# Patient Record
Sex: MALE | Race: WHITE | Employment: FULL TIME | URBAN - METROPOLITAN AREA
[De-identification: names, ages, dates, MRNs, and addresses within clinical notes are randomized per-mention and may not be internally consistent; named-entity substitution may affect disease eponyms.]

---

## 2020-01-18 ENCOUNTER — HOSPITAL ENCOUNTER (EMERGENCY)
Age: 70
Discharge: HOME | End: 2020-01-18
Attending: EMERGENCY MEDICINE
Payer: COMMERCIAL

## 2020-01-18 ENCOUNTER — APPOINTMENT (OUTPATIENT)
Dept: GENERAL RADIOLOGY | Age: 70
End: 2020-01-18
Payer: COMMERCIAL

## 2020-01-18 VITALS
TEMPERATURE: 98.2 F | SYSTOLIC BLOOD PRESSURE: 147 MMHG | WEIGHT: 220 LBS | DIASTOLIC BLOOD PRESSURE: 78 MMHG | OXYGEN SATURATION: 99 % | RESPIRATION RATE: 16 BRPM | HEIGHT: 72 IN | HEART RATE: 77 BPM | BODY MASS INDEX: 29.8 KG/M2

## 2020-01-18 LAB
A/G RATIO: 2 (ref 1.1–2.2)
ALBUMIN SERPL-MCNC: 4.9 G/DL (ref 3.4–5)
ALP BLD-CCNC: 76 U/L (ref 40–129)
ALT SERPL-CCNC: 14 U/L (ref 10–40)
ANION GAP SERPL CALCULATED.3IONS-SCNC: 13 MMOL/L (ref 3–16)
AST SERPL-CCNC: 15 U/L (ref 15–37)
BASOPHILS ABSOLUTE: 0 K/UL (ref 0–0.2)
BASOPHILS RELATIVE PERCENT: 0.5 %
BILIRUB SERPL-MCNC: 0.3 MG/DL (ref 0–1)
BUN BLDV-MCNC: 20 MG/DL (ref 7–20)
CALCIUM SERPL-MCNC: 9.7 MG/DL (ref 8.3–10.6)
CHLORIDE BLD-SCNC: 96 MMOL/L (ref 99–110)
CO2: 25 MMOL/L (ref 21–32)
CREAT SERPL-MCNC: 1.4 MG/DL (ref 0.8–1.3)
EOSINOPHILS ABSOLUTE: 0.3 K/UL (ref 0–0.6)
EOSINOPHILS RELATIVE PERCENT: 2.6 %
GFR AFRICAN AMERICAN: >60
GFR NON-AFRICAN AMERICAN: 50
GLOBULIN: 2.5 G/DL
GLUCOSE BLD-MCNC: 260 MG/DL (ref 70–99)
HCT VFR BLD CALC: 44.9 % (ref 40.5–52.5)
HEMOGLOBIN: 15.2 G/DL (ref 13.5–17.5)
LYMPHOCYTES ABSOLUTE: 2 K/UL (ref 1–5.1)
LYMPHOCYTES RELATIVE PERCENT: 20.6 %
MCH RBC QN AUTO: 31 PG (ref 26–34)
MCHC RBC AUTO-ENTMCNC: 33.9 G/DL (ref 31–36)
MCV RBC AUTO: 91.5 FL (ref 80–100)
MONOCYTES ABSOLUTE: 0.9 K/UL (ref 0–1.3)
MONOCYTES RELATIVE PERCENT: 9.5 %
NEUTROPHILS ABSOLUTE: 6.6 K/UL (ref 1.7–7.7)
NEUTROPHILS RELATIVE PERCENT: 66.8 %
PDW BLD-RTO: 13.6 % (ref 12.4–15.4)
PLATELET # BLD: 273 K/UL (ref 135–450)
PMV BLD AUTO: 8.1 FL (ref 5–10.5)
POTASSIUM SERPL-SCNC: 4.1 MMOL/L (ref 3.5–5.1)
RBC # BLD: 4.9 M/UL (ref 4.2–5.9)
SODIUM BLD-SCNC: 134 MMOL/L (ref 136–145)
TOTAL PROTEIN: 7.4 G/DL (ref 6.4–8.2)
WBC # BLD: 9.9 K/UL (ref 4–11)

## 2020-01-18 PROCEDURE — 80053 COMPREHEN METABOLIC PANEL: CPT

## 2020-01-18 PROCEDURE — 73030 X-RAY EXAM OF SHOULDER: CPT

## 2020-01-18 PROCEDURE — 85025 COMPLETE CBC W/AUTO DIFF WBC: CPT

## 2020-01-18 PROCEDURE — 99283 EMERGENCY DEPT VISIT LOW MDM: CPT

## 2020-01-18 PROCEDURE — 6370000000 HC RX 637 (ALT 250 FOR IP): Performed by: EMERGENCY MEDICINE

## 2020-01-18 RX ORDER — ACETAMINOPHEN 500 MG
1000 TABLET ORAL ONCE
Status: COMPLETED | OUTPATIENT
Start: 2020-01-18 | End: 2020-01-18

## 2020-01-18 RX ORDER — MORPHINE SULFATE 15 MG/1
15 TABLET ORAL EVERY 4 HOURS PRN
Qty: 11 TABLET | Refills: 0 | Status: SHIPPED | OUTPATIENT
Start: 2020-01-18 | End: 2020-01-21

## 2020-01-18 RX ORDER — CYCLOBENZAPRINE HCL 10 MG
10 TABLET ORAL ONCE
Status: COMPLETED | OUTPATIENT
Start: 2020-01-18 | End: 2020-01-18

## 2020-01-18 RX ORDER — CYCLOBENZAPRINE HCL 10 MG
10 TABLET ORAL 3 TIMES DAILY PRN
Qty: 30 TABLET | Refills: 0 | Status: SHIPPED | OUTPATIENT
Start: 2020-01-18 | End: 2020-01-28

## 2020-01-18 RX ADMIN — ACETAMINOPHEN 1000 MG: 500 TABLET, FILM COATED ORAL at 22:58

## 2020-01-18 RX ADMIN — CYCLOBENZAPRINE 10 MG: 10 TABLET, FILM COATED ORAL at 22:58

## 2020-01-18 ASSESSMENT — PAIN SCALES - GENERAL
PAINLEVEL_OUTOF10: 10
PAINLEVEL_OUTOF10: 10

## 2020-01-18 ASSESSMENT — PAIN DESCRIPTION - LOCATION: LOCATION: SHOULDER

## 2020-01-18 ASSESSMENT — PAIN DESCRIPTION - FREQUENCY: FREQUENCY: CONTINUOUS

## 2020-01-18 ASSESSMENT — PAIN DESCRIPTION - PAIN TYPE: TYPE: ACUTE PAIN

## 2020-01-18 ASSESSMENT — PAIN DESCRIPTION - PROGRESSION: CLINICAL_PROGRESSION: NOT CHANGED

## 2020-01-19 NOTE — ED NOTES
Bed: 18  Expected date:   Expected time:   Means of arrival:   Comments:  closed     Lyssa Alfonso RN  01/18/20 2112

## 2020-01-19 NOTE — ED PROVIDER NOTES
Emergency Physician Note    Chief Complaint  Fall (Pt reports falling down an entire flight of stairs with boxes earlier this evening. Pt reports left neck and shoulder pain. Left shoulder is deformed.)       History of Present Illness  Abelardo Torres is a 81 y.o. male who presents to the ED for shoulder injury. Patient reports that he sustained a mechanical fall down a flight of stairs while moving boxes. He denies any head injury or loss of consciousness. He states he injured his left shoulder. He denies any other acute pain complaints. 10 systems reviewed, pertinent positives per HPI otherwise noted to be negative    I have reviewed the following from the nursing documentation:      Prior to Admission medications    Medication Sig Start Date End Date Taking? Authorizing Provider   aspirin 81 MG chewable tablet Take 81 mg by mouth daily. Historical Provider, MD   Lactobacillus (PROBIATA) TABS Take 1 tablet by mouth daily. Historical Provider, MD   Multiple Vitamins-Minerals (THERAPEUTIC MULTIVITAMIN-MINERALS) tablet Take 1 tablet by mouth daily. Historical Provider, MD   Cholecalciferol (VITAMIN D3) 5000 UNITS TABS Take 1 each by mouth daily. Historical Provider, MD   cyclobenzaprine (FLEXERIL) 10 MG tablet Take 1 tablet by mouth 3 times daily as needed for Muscle spasms. 6/30/14   Hannah Ireland MD   metFORMIN (GLUCOPHAGE) 500 MG tablet Take 1,000 mg by mouth 2 times daily (with meals). Historical Provider, MD   glipiZIDE (GLUCOTROL) 10 MG CR tablet Take 10 mg by mouth 2 times daily. Historical Provider, MD   simvastatin (ZOCOR) 40 MG tablet Take 40 mg by mouth nightly. Historical Provider, MD   lisinopril (PRINIVIL;ZESTRIL) 5 MG tablet Take 5 mg by mouth daily. Historical Provider, MD   levothyroxine (SYNTHROID) 150 MCG tablet Take 150 mcg by mouth daily. Historical Provider, MD   cetirizine (ZYRTEC) 10 MG tablet Take 10 mg by mouth daily.     Historical Provider, MD Allergies as of 2020 - Review Complete 2020   Allergen Reaction Noted    Corn-containing products  2013    Oat  2020    Peanut-containing drug products  2020    Soybean-containing drug products  2013       Past Medical History:   Diagnosis Date    Grave's disease     Type II or unspecified type diabetes mellitus without mention of complication, not stated as uncontrolled         Surgical History: History reviewed. No pertinent surgical history. Family History:  History reviewed. No pertinent family history.     Social History     Socioeconomic History    Marital status:      Spouse name: Not on file    Number of children: Not on file    Years of education: Not on file    Highest education level: Not on file   Occupational History    Not on file   Social Needs    Financial resource strain: Not on file    Food insecurity:     Worry: Not on file     Inability: Not on file    Transportation needs:     Medical: Not on file     Non-medical: Not on file   Tobacco Use    Smoking status: Former Smoker     Packs/day: 1.00     Types: Cigars     Last attempt to quit: 2014     Years since quittin.1   Substance and Sexual Activity    Alcohol use: No    Drug use: Not on file    Sexual activity: Yes     Partners: Female   Lifestyle    Physical activity:     Days per week: Not on file     Minutes per session: Not on file    Stress: Not on file   Relationships    Social connections:     Talks on phone: Not on file     Gets together: Not on file     Attends Buddhist service: Not on file     Active member of club or organization: Not on file     Attends meetings of clubs or organizations: Not on file     Relationship status: Not on file    Intimate partner violence:     Fear of current or ex partner: Not on file     Emotionally abused: Not on file     Physically abused: Not on file     Forced sexual activity: Not on file   Other Topics Concern    Not on file   Social History Narrative    Not on file       Nursing notes reviewed. ED Triage Vitals [01/18/20 2101]   Enc Vitals Group      BP (!) 168/79      Pulse       Resp 18      Temp 97.6 °F (36.4 °C)      Temp Source Oral      SpO2 97 %      Weight 220 lb (99.8 kg)      Height 6' (1.829 m)      Head Circumference       Peak Flow       Pain Score       Pain Loc       Pain Edu? Excl. in [address]? GENERAL:  Awake, alert. Well developed, well nourished with no apparent distress. HENT:  Normocephalic, Atraumatic, moist mucous membranes. EYES:  Pupils equal round and reactive to light, Conjunctiva normal, extraocular movements normal.  NECK:  No meningeal signs, Supple. No tenderness. CHEST:  Regular rate and rhythm, chest wall non-tender. LUNGS:  Clear to auscultation bilaterally. BACK:  No tenderness. No step-offs, contusions or abrasions throughout the cervical, thoracic or lumbar spine. EXTREMITIES: Pain limited range of motion of the left shoulder, deformity palpable at the left [facility] joint, neurovascular intact to the fingers of the left hand,, no deformity, distal pulses present. SKIN: Warm, dry and intact. NEUROLOGIC: Normal mental status. Moving all extremities to command. RADIOLOGY  X-RAYS:  I have reviewed radiologic plain film image(s). ALL OTHER NON-PLAIN FILM IMAGES SUCH AS CT, ULTRASOUND AND MRI HAVE BEEN READ BY THE RADIOLOGIST. XR SHOULDER LEFT (MIN 2 VIEWS)   Final Result   AC joint separation as described above.               LABS  Labs Reviewed   COMPREHENSIVE METABOLIC PANEL - Abnormal; Notable for the following components:       Result Value    Sodium 134 (*)     Chloride 96 (*)     Glucose 260 (*)     CREATININE 1.4 (*)     GFR Non- 50 (*)     All other components within normal limits    Narrative:     Performed at:  [facility] [address],  [place], [facility]   Phone [phone]   CBC WITH AUTO DIFFERENTIAL Narrative:     Performed at:  [facility]) [facility]  [address],  [place], [place]   Phone [phone]       MEDICAL DECISION MAKING         I estimate there is LOW risk for COMPARTMENT SYNDROME, DEEP VENOUS THROMBOSIS, SEPTIC ARTHRITIS, TENDON OR NEUROVASCULAR INJURY, thus I consider the discharge disposition reasonable. Denise Youssef and I have discussed the diagnosis and risks, and we agree with discharging home to follow-up with their primary doctor or the referral orthopedist. We also discussed returning to the Emergency Department immediately if new or worsening symptoms occur. We have discussed the symptoms which are most concerning (e.g., changing or worsening pain, numbness, weakness) that necessitate immediate return. Final Impression    1. AC separation, left, initial encounter        Blood pressure (!) 147/78, pulse 77, temperature 98.2 °F (36.8 °C), temperature source Oral, resp. rate 16, height 6' (1.829 m), weight 220 lb (99.8 kg), SpO2 99 %. Patient was given scripts for the following medications. I counseled patient how to take these medications. Discharge Medication List as of 1/18/2020 10:24 PM      START taking these medications    Details   morphine (MSIR) 15 MG tablet Take 1 tablet by mouth every 4 hours as needed for Pain for up to 3 days. , Disp-11 tablet, R-0Print             Disposition  Pt is in good condition upon Discharge to home. This chart was generated using the [address] [place] [address] [address]  dictation system. I created this record but it may contain dictation errors.           Gisella Cope MD  01/18/20 6226

## 2020-02-21 ENCOUNTER — HOSPITAL ENCOUNTER (OUTPATIENT)
Dept: PHYSICAL THERAPY | Age: 70
Setting detail: THERAPIES SERIES
Discharge: HOME | End: 2020-02-21
Payer: COMMERCIAL

## 2020-02-21 PROCEDURE — 97140 MANUAL THERAPY 1/> REGIONS: CPT

## 2020-02-21 PROCEDURE — 97161 PT EVAL LOW COMPLEX 20 MIN: CPT

## 2020-02-21 PROCEDURE — 20560 NDL INSJ W/O NJX 1 OR 2 MUSC: CPT

## 2020-02-21 NOTE — PLAN OF CARE
[facility]    The following patient has been evaluated for physical therapy services. In order for therapy to continue, Medicare requires physician review of the treatment plan. Please review the attached evaluation and/or summary of the patient's plan of care, and verify that you agree by signing below and sending it back to our office. Thank you for this referral.    Physician signature_______________________ Date________________    Fax to:   [id number] [phone]              [facility] PH[facility]Y EVALUATION      Evaluation Date: 2/21/2020       Patient Name: Sean Smith  YOB: 1950      Medical Diagnosis: Dislocation L AC jt  ICD 10:  S43.122A  Treatment Diagnosis:   Dislocation L AC jt with L shldr pn, decr ROM, decr strength     Onset Date: Jan 18, 2020  Referral Date:  2/19/2020  Referring Physician: Dona Dubon MD     Visits Allowed/Insurance/Certification Information:  Medicare      Restrictions/Precautions:    Contraindications to Manipulations:   none    Red Flag Symptoms: Nausea/vomiting (metformin)  heart palpitations (started with Graves disease, PCP knows)  Adhesive allergy: yes, leaves sores  Latex allergy: no    Pt's Occupation/Job Duties:  Works, , full time, desk work,      Social support/Environment:    Family/caregiver support:      Wife     Home environment:   one story home with basement  Steps to enter first floor:    6 three inch steps to enter   Steps to second floor:  Full flight of 12-13  Bathroom:    Tub/Shower unit and Shower Chair  he does not use  Equipment owned:   Rollator  (as needed)    Health History reviewed with pt:  Graves disease (lost 60lbs), DM II 2004 managed with medication,       SUBJECTIVE FINDINGS        History of Present Illness:  Pt was headed downstairs into the basement and fell head first down the stairs on January 18th, 2020.  Pt went to the ER, went to ortho surgeon, was Testing     All strength tested on 5 point scale  All ROM WFL with exceptions noted below    Range Tested AROM PROM Resisted Comments   *denotes pain Left Right Left Right Left Right    Cervical Flexion (C1-2)          Cervical Sidebend (C3)          Shoulder Shrug (C4)  pnful     pn in posterior clavicular area   Shoulder Flexion  160        Shoulder Extension          Shoulder Abduction (C5)  160        Shoulder Adduction   WNL         Shoulder IR  WNL        Shoulder ER          Elbow Flexion (C6)          Elbow Extension (C7)          Pronation          Supination          Wrist Flexion (C7)          Wrist Extension (C6)          Radial Deviation          Ulnar Deviation                     Thumb Extension (C8)          Hand Intrinsics (T1)              Special Tests- unnecessary     Specific Joint Mobility Testing/Accessory Motions      Temporomandibular:  NT  Cervical:    NT  Thoracic:    NT  Ribs:     NT  AC joint:    hypermobile on L  Glenohumeral joint:   NT      Functional Outcome Measure:     Measure Used:  SPADI  Date Assessed:  2/21/2020  Score:    55/130 (SPADI)  % disability:   42 %      ASSESSMENT   Pt is 77 yo Male presenting to OP PT clinic with medical diagnosis of L ACJ separation. Presents today with L ACJ separation with L shldr pn and dysfunction. Would benefit from continued OP PT to address below impairments, improve pain and restore function. Problems        Decreased UE ROM  Decreased strength  Increased pain  Poor posture/alignment  Decreased functional status    Rehabilitation Potential:  Good for goals listed below. Strengths for achieving goals include:  Pt motivated, PLOF, Family Support and Pt cooperative  Limitations for achieving goals include:  severity of condition    Prognosis:   Good    GOALS    Short Term Goals:    4 weeks Long Term Goals:   8  weeks   1). Establish HEP 1). Pt independent with HEP   2). Pain  0/10 at rest 2). Pain  0/10 with movement   3).  Pt with incr ROM with <2/10 pn  3). Pt with full ROM pn free    4). 4).   5). 5).   6). 6). PLAN OF CARE     To see patient 2x/week for 8 weeks for the following treatment interventions:  Therapeutic Exercise  Manual therapy  Neuromuscular Re-ed  Therapeutic activity    Thank you for the referral of this patient.       Timed Code Treatment Minutes:     31 minutes   Total Treatment Time:    60 minutes    Juan Luis Nur,  PT, DPT, ATC [id number]

## 2020-02-21 NOTE — FLOWSHEET NOTE
complications   []Other:     Goals:   Progress towards goals:  Goals established on 2/21/2020  Short Term Goals:    4 weeks Long Term Goals:   8  weeks   1). Establish HEP 1). Pt independent with HEP   2). Pain  0/10 at rest 2). Pain  0/10 with movement   3). Pt with incr ROM with <2/10 pn  3). Pt with full ROM pn free    4). 4).   5). 5).   6). 6).          Prognosis: [x]Good   []Fair   []Poor  Patient Requires Follow-up:  [x]Yes  []No  Plan: [x]Plan of care initiated     []Continue per plan of care    [] Alter current plan (see comments)    []Hold pending MD visit []Discharge    Timed Code Treatment Minutes:  31  Total Treatment Minutes:  60    Medicare Cap total YTD: 160  Workers Comp Time Stamp  [x]N/A   Time In:   Time Out:     Electronically signed by:  Charlotte Llanes PT, DPT, ATC [id number]

## 2020-02-24 ENCOUNTER — HOSPITAL ENCOUNTER (OUTPATIENT)
Dept: PHYSICAL THERAPY | Age: 70
Setting detail: THERAPIES SERIES
Discharge: HOME | End: 2020-02-24
Payer: COMMERCIAL

## 2020-02-24 PROCEDURE — 20560 NDL INSJ W/O NJX 1 OR 2 MUSC: CPT

## 2020-02-24 PROCEDURE — 97140 MANUAL THERAPY 1/> REGIONS: CPT

## 2020-02-24 NOTE — PRE-CERTIFICATION NOTE
Gilson Bunting is primary - no max  No pre cert  72% co-ins     Medicare A is secondary    Dry needling may not be covered under [employer]    ABN signed for dry needling

## 2020-02-24 NOTE — FLOWSHEET NOTE
Outpatient Physical Therapy     [x] Daily Treatment Note   [] Progress Note   [] Discharge Note    Date:  2/24/2020    Patient Name:  Feliz Frias    YOB: 1950    Medical Diagnosis: Dislocation L AC jt  ICD 10:  S43.122A  Treatment Diagnosis:   Dislocation L AC jt with L shldr pn, decr ROM, decr strength     Onset Date: Jan 18, 2020  Referral Date:  2/19/2020  Referring Physician: Félix Munson MD     Visits Allowed/Insurance/Certification Information: [employer] & Medicare      Restrictions/Precautions:    Contraindications to Manipulations: none  Red Flag Symptoms: Nausea/vomiting (metformin)  heart palpitations (started with Graves disease, PCP knows)  Adhesive allergy: yes, leaves sores  Latex allergy: no     Pt's Occupation/Job Duties:  Works as , full time, desk work,    Plan of care sent to provider:   []NA   [x]Faxed   []Co-signature     Plan of care signed:    []NA   []Yes   []No      Progress Note covers period from (if applicable):    [x]NA    []From          To         Next Progress Note due:   3/20/2020    Visit# / total visits:  2/16  Plan for Next Session:  Address tissue tension, complete PROM, AAROM, mobilization as needed      Subjective:  Pt states he feels he is moving a lot better. Soreness from DN subsided within 2 days and it only hurts with quick movements. Pain level: 0/10 at rest  AT EVAL: Patient describes pain to be aching  Pain location:  Posterior L shldr 2-3/10  Worsened by  Pushing/ pulling  Improved by  unknown  Pt. Reports locking, popping, buckling, etc. L shldr, jaw, feels as though bones rub together in flx  Sleep: poor due to pn     Objective:   pn with LUE abd  BUE ROM WFL bilat  Improved shldr ER     Exercises in bold performed in department today. Items not bolded are carried forward from prior visits for continuity of the record.   Exercise/Equipment Resistance/Repetitions HEP Other comments       []      Standing isometrics, 4 way + IR, ER 7-10 sec x5 [x]    Standing rows   Red x10 [x] Review NV       []        []        []        []        []        []          []         []        []        []        []        []        []        []        []      Therapeutic Exercise/Home Exercise Program:   10 minutes (u)    Manual Therapy:  15 minutes (1u) + DN 8 minutes (1u)  PROM LUE  GrIII CPA tspine grossly  GrIV UPA L lower tspine  PMR L pec      TrP DN L anterior deltoid, L UT with resolution in pn with abd & 4+/5 strength flx, abd without pn    Consent signed 2/21/2020 and precautions addressed. See media tab. **The above techniques were used to restore functional range of motion, reduce muscle spasm, and induce healing in the corresponding musculature by means of intramuscular mobilization. Clean Technique was utilized today while applying the Dry needling treatment. The treatment sites where cleaned with 70% solution of isopropyl alcohol. The PT washed their hands and utilized treatment gloves along with hand  prior to inserting the needles. All needles where removed and discarded in the appropriate sharps container. **  ** Educated patient on anatomy, trigger point etiology, expectations for TDN (bruising, soreness, etc), outcomes, and recommendations for exercise. **    Modalities: 0 minutes    Group Therapy:    0 minutes  Therapeutic Activity:  0 minutes  Gait: 0 minutes  Neuromuscular Re-Education:  0 minutes  Canalith Repositioning Procedure:  0 minutes    Functional Outcome Measure:     Measure Used:           SPADI  Date Assessed:           2/21/2020  Score:                          55/130 (SPADI)  % disability:                 42 %     Assessment/Treatment/Activity Tolerance: Pt with great response to DN last visit, and resolution in pn with treatment this date. Pt unable to tolerate resisted shldr exercise at this time therefore started with isometrics.    Patients response to treatment:   [x]Patient tolerated treatment well []Patient limited by fatigue   []Patient limited by pain  []Patient limited by other medical complications   []Other:     Goals:   Progress towards goals:  Goals established on 2/21/2020  Short Term Goals:    4 weeks Long Term Goals:   8  weeks   1). Establish HEP 1). Pt independent with HEP   2). Pain  0/10 at rest 2). Pain  0/10 with movement   3). Pt with incr ROM with <2/10 pn  3). Pt with full ROM pn free    4). 4).   5). 5).   6). 6).          Prognosis: [x]Good   []Fair   []Poor  Patient Requires Follow-up:  [x]Yes  []No  Plan: []Plan of care initiated     [x]Continue per plan of care    [] Alter current plan (see comments)    []Hold pending MD visit []Discharge    Timed Code Treatment Minutes:  33  Total Treatment Minutes:  33    Medicare Cap total YTD:   Workers Comp Time Stamp  [x]N/A       Electronically signed by:  Gaby Palencia, PT, DPT, ATC [id number]

## 2020-02-26 ENCOUNTER — HOSPITAL ENCOUNTER (OUTPATIENT)
Dept: PHYSICAL THERAPY | Age: 70
Setting detail: THERAPIES SERIES
Discharge: HOME | End: 2020-02-26
Payer: COMMERCIAL

## 2020-02-26 PROCEDURE — 97110 THERAPEUTIC EXERCISES: CPT

## 2020-02-26 PROCEDURE — 97140 MANUAL THERAPY 1/> REGIONS: CPT

## 2020-02-26 NOTE — FLOWSHEET NOTE
Exercises in bold performed in department today. Items not bolded are carried forward from prior visits for continuity of the record. Exercise/Equipment Resistance/Repetitions HEP Other comments       []      Standing isometrics, 4 way + IR, ER 7-10 sec x5 [x]    Standing rows    Red x10 [x] Required corrective cuing   Standing shldr 4 way Red x10 []      SL shldr ER  1# x10 []        []        []        []        []          []         []        []        []        []      Supine breathing x10 min  []      Supine TA/PF x5 min  []        []        []      Therapeutic Exercise/Home Exercise Program:   30 minutes (2u)  Se above. Attempted standing ER with tband, too difficult for pt at this time. Manual Therapy:  minutes (u) + DN  minutes (u)    Consent signed 2/21/2020 and precautions addressed. See media tab. **The above techniques were used to restore functional range of motion, reduce muscle spasm, and induce healing in the corresponding musculature by means of intramuscular mobilization. Clean Technique was utilized today while applying the Dry needling treatment. The treatment sites where cleaned with 70% solution of isopropyl alcohol. The PT washed their hands and utilized treatment gloves along with hand  prior to inserting the needles. All needles where removed and discarded in the appropriate sharps container. **  ** Educated patient on anatomy, trigger point etiology, expectations for TDN (bruising, soreness, etc), outcomes, and recommendations for exercise.  **    Modalities: 0 minutes    Group Therapy:    0 minutes  Therapeutic Activity:  0 minutes  Gait: 0 minutes  Neuromuscular Re-Education:  0 minutes  Canalith Repositioning Procedure:  0 minutes    Functional Outcome Measure:     Measure Used:           SPADI  Date Assessed:           2/21/2020  Score:                          55/130 (SPADI)  % disability:                 42 %     Assessment/Treatment/Activity Tolerance: Pt without pn this date and good tolerance to therapeutic exercises. Pt with non-painful diastasis recti with protrusion in forward flexion which effects pt's ability to lift safely using core vs. Back and UE. Patients response to treatment:   [x]Patient tolerated treatment well []Patient limited by fatigue   []Patient limited by pain  []Patient limited by other medical complications   []Other:     Goals:   Progress towards goals:  Goals established on 2/21/2020  Short Term Goals:    4 weeks Long Term Goals:   8  weeks   1). Establish HEP 1). Pt independent with HEP   2). Pain  0/10 at rest 2). Pain  0/10 with movement   3). Pt with incr ROM with <2/10 pn  3). Pt with full ROM pn free    4). 4).   5). 5).   6). 6).          Prognosis: [x]Good   []Fair   []Poor  Patient Requires Follow-up:  [x]Yes  []No  Plan: []Plan of care initiated     [x]Continue per plan of care    [] Alter current plan (see comments)    []Hold pending MD visit []Discharge    Timed Code Treatment Minutes:  30  Total Treatment Minutes:  30    Medicare Cap total YTD:   Workers Comp Time Stamp  [x]N/A       Electronically signed by:  Floridalma Ryan, PT, DPT, ATC [id number]

## 2020-03-04 ENCOUNTER — HOSPITAL ENCOUNTER (OUTPATIENT)
Dept: PHYSICAL THERAPY | Age: 70
Setting detail: THERAPIES SERIES
Discharge: HOME | End: 2020-03-04
Payer: COMMERCIAL

## 2020-03-06 ENCOUNTER — HOSPITAL ENCOUNTER (OUTPATIENT)
Dept: PHYSICAL THERAPY | Age: 70
Setting detail: THERAPIES SERIES
Discharge: HOME | End: 2020-03-06
Payer: COMMERCIAL

## 2020-03-06 PROCEDURE — 97140 MANUAL THERAPY 1/> REGIONS: CPT

## 2020-03-06 PROCEDURE — 20560 NDL INSJ W/O NJX 1 OR 2 MUSC: CPT

## 2020-03-06 NOTE — FLOWSHEET NOTE
Outpatient Physical Therapy     [x] Daily Treatment Note   [] Progress Note   [] Discharge Note    Date:  3/6/2020    Patient Name:  Issac Tamayo    YOB: 1950    Medical Diagnosis: Dislocation L AC jt  ICD 10:  S43.122A  Treatment Diagnosis:   Dislocation L AC jt with L shldr pn, decr ROM, decr strength     Onset Date: Jan 18, 2020  Referral Date:  2/19/2020  Referring Physician: Shade Schuler MD     Visits Allowed/Insurance/Certification Information: [employer] & Medicare      Restrictions/Precautions:    Contraindications to Manipulations: none  Red Flag Symptoms: Nausea/vomiting (metformin)  heart palpitations (started with Graves disease, PCP knows)  Adhesive allergy: yes, leaves sores  Latex allergy: no     Pt's Occupation/Job Duties:  Works as , full time, desk work,    Plan of care sent to provider:   []NA   [x]Faxed   []Co-signature     Plan of care signed:    []NA   []Yes   []No      Progress Note covers period from (if applicable):    [x]NA    []From          To         Next Progress Note due:   3/20/2020    Visit# / total visits:  4/16  Plan for Next Session:  Address tissue tension, complete PROM, AAROM, mobilization as needed      Subjective:  Pt no longer has pain, just stiff, can sleep on his shldr. Wife was sick for a week, and had to take her to ED. Pain level: 0/10 L shldr  AT EVAL: Patient describes pain to be aching  Pain location:  Posterior L shldr 2-3/10  Worsened by  Pushing/ pulling  Improved by  unknown  Pt. Reports locking, popping, buckling, etc. L shldr, jaw, feels as though bones rub together in flx  Sleep: poor due to pn     Objective:   pn with LUE abd  BUE ROM WFL bilat  Improved shldr ER     Exercises in bold performed in department today. Items not bolded are carried forward from prior visits for continuity of the record.   Exercise/Equipment Resistance/Repetitions HEP Other comments       []      Standing isometrics, 4 way + IR, ER 7-10 sec x5 [x]    Standing rows    Red x10 [x]    Standing shldr 4 way Red x10 []      SL shldr ER  1# x10 []        []      PROM/AAROM pulleys UE (flx, abd) x5 min  [] Without pn or pulling      AROM (flx, abd) x5  []        []          []         []        []        []        []      Supine breathing x10 min  []      Supine TA/PF x5 min  []        []        []      Therapeutic Exercise/Home Exercise Program:   5 minutes (u)  Se above. Manual Therapy:  15 minutes (1u) + 8 DN  minutes (1u)  ART L deltoid  GrIII LUE oscillation mob  GrIII-IV inferior, AP L GHJ   LUE PROM with improvement in UE ROM to [facility] (same compared to RUE)    TrP DN L middle/posterior deltoid with resolution in \"block\" with abduction     Consent signed 2/21/2020 and precautions addressed. See media tab. **The above techniques were used to restore functional range of motion, reduce muscle spasm, and induce healing in the corresponding musculature by means of intramuscular mobilization. Clean Technique was utilized today while applying the Dry needling treatment. The treatment sites where cleaned with 70% solution of isopropyl alcohol. The PT washed their hands and utilized treatment gloves along with hand  prior to inserting the needles. All needles where removed and discarded in the appropriate sharps container. **  ** Educated patient on anatomy, trigger point etiology, expectations for TDN (bruising, soreness, etc), outcomes, and recommendations for exercise.  **    Modalities: 0 minutes    Group Therapy:    0 minutes  Therapeutic Activity:  0 minutes  Gait: 0 minutes  Neuromuscular Re-Education:  0 minutes  Canalith Repositioning Procedure:  0 minutes    Functional Outcome Measure:     Measure Used:           SPADI  Date Assessed:           2/21/2020  Score:                          55/130 (SPADI)  % disability:                 42 %     Assessment/Treatment/Activity Tolerance: Pt without pn this date and improvement in LUE AROM this date. Will progress pt strength as tolerated. Patients response to treatment:   [x]Patient tolerated treatment well []Patient limited by fatigue   []Patient limited by pain  []Patient limited by other medical complications   []Other:     Goals:   Progress towards goals:  Goals established on 2/21/2020  Short Term Goals:    4 weeks Long Term Goals:   8  weeks   1). Establish HEP MET 1). Pt independent with HEP   2). Pain  0/10 at rest MET 2). Pain  0/10 with movement   3). Pt with incr ROM with <2/10 pn  MET 3). Pt with full ROM pn free    4). 4).   5). 5).   6). 6).          Prognosis: [x]Good   []Fair   []Poor  Patient Requires Follow-up:  [x]Yes  []No  Plan: []Plan of care initiated     [x]Continue per plan of care    [] Alter current plan (see comments)    []Hold pending MD visit []Discharge    Timed Code Treatment Minutes:  20  Total Treatment Minutes:  28    Medicare Cap total YTD:   Workers Comp Time Stamp  [x]N/A       Electronically signed by:  Ferdinand Winkler PT, DPT, ATC [id number]

## 2020-03-13 ENCOUNTER — HOSPITAL ENCOUNTER (OUTPATIENT)
Dept: PHYSICAL THERAPY | Age: 70
Setting detail: THERAPIES SERIES
Discharge: HOME | End: 2020-03-13
Payer: COMMERCIAL

## 2020-03-13 PROCEDURE — 20560 NDL INSJ W/O NJX 1 OR 2 MUSC: CPT

## 2020-03-13 PROCEDURE — 97140 MANUAL THERAPY 1/> REGIONS: CPT

## 2020-03-13 NOTE — FLOWSHEET NOTE
Outpatient Physical Therapy     [x] Daily Treatment Note   [] Progress Note   [] Discharge Note    Date:  3/13/2020    Patient Name:  German Mcgovern    YOB: 1950    Medical Diagnosis: Dislocation L AC jt  ICD 10:  S43.122A  Treatment Diagnosis:   Dislocation L AC jt with L shldr pn, decr ROM, decr strength     Onset Date: Jan 18, 2020  Referral Date:  2/19/2020  Referring Physician: Mario Tubbs MD     Visits Allowed/Insurance/Certification Information: [employer] & Medicare      Restrictions/Precautions:    Contraindications to Manipulations: none  Red Flag Symptoms: Nausea/vomiting (metformin)  heart palpitations (started with Graves disease, PCP knows)  Adhesive allergy: yes, leaves sores  Latex allergy: no     Pt's Occupation/Job Duties:  Works as , full time, desk work,    Plan of care sent to provider:   []NA   [x]Faxed   []Co-signature     Plan of care signed:    []NA   []Yes   []No      Progress Note covers period from (if applicable):    [x]NA    []From          To         Next Progress Note due:   3/20/2020    Visit# / total visits:  5/16  Plan for Next Session:  Address tissue tension, complete PROM, AAROM, mobilization as needed      Subjective:  Pt state his shoulder is a little stiff and that's it. Able to clean his whole basement. Pain level: 0/10 L shldr  AT EVAL: Patient describes pain to be aching  Pain location:  Posterior L shldr 2-3/10  Worsened by  Pushing/ pulling  Improved by  unknown  Pt. Reports locking, popping, buckling, etc. L shldr, jaw, feels as though bones rub together in flx  Sleep: poor due to pn     Objective:   Slight pn with LUE abd in deltoid  BUE ROM WFL bilat  Improved shldr ER      Exercises in bold performed in department today. Items not bolded are carried forward from prior visits for continuity of the record.   Exercise/Equipment Resistance/Repetitions HEP Other comments       []      Standing isometrics, 4 way + IR, ER 7-10 sec x5 [x]    Standing rows    Red x10 [x]    Standing shldr 4 way Red x10 [x]      SL shldr ER  1# x10 []        []      PROM/AAROM pulleys UE (flx, abd) dc [] Without pn or pulling      AROM (flx, abd) dc []        []          []         []        []        []        []      Supine breathing x10 min  []      Supine TA/PF x5 min  []        []        []      Therapeutic Exercise/Home Exercise Program:    minutes (u)  Se above. Manual Therapy:  25 minutes (2u) + 5 DN  minutes (1u)  GrIII-IV inferior (normal), AP L GHJ  lUE PROM  ART L deltoid without change     TrP DN L middle/posterior deltoid with resolution in \"block\" with abduction, infraspinatus      Consent signed 2/21/2020 and precautions addressed. See media tab. **The above techniques were used to restore functional range of motion, reduce muscle spasm, and induce healing in the corresponding musculature by means of intramuscular mobilization. Clean Technique was utilized today while applying the Dry needling treatment. The treatment sites where cleaned with 70% solution of isopropyl alcohol. The PT washed their hands and utilized treatment gloves along with hand  prior to inserting the needles. All needles where removed and discarded in the appropriate sharps container. **  ** Educated patient on anatomy, trigger point etiology, expectations for TDN (bruising, soreness, etc), outcomes, and recommendations for exercise. **    Modalities: 0 minutes    Group Therapy:    0 minutes  Therapeutic Activity:  0 minutes  Gait: 0 minutes  Neuromuscular Re-Education:  0 minutes  Canalith Repositioning Procedure:  0 minutes    Functional Outcome Measure:     Measure Used:           SPADI  Date Assessed:           2/21/2020  Score:                          55/130 (SPADI)  % disability:                 42 %     Assessment/Treatment/Activity Tolerance: Pt without pn this date and improvement in LUE AROM this date.  Pt instructed to continue isometrics and band exercises as pt states these are still challenging. Instructed pt on appropriate progression and rep/set count. Patients response to treatment:   [x]Patient tolerated treatment well []Patient limited by fatigue   []Patient limited by pain  []Patient limited by other medical complications   []Other:     Goals:   Progress towards goals:  Goals established on 2/21/2020  Short Term Goals:    4 weeks Long Term Goals:   8  weeks   1). Establish HEP MET 1). Pt independent with HEP MET   2). Pain  0/10 at rest MET 2). Pain  0/10 with movement   3). Pt with incr ROM with <2/10 pn  MET 3). Pt with full ROM pn free    4). 4).   5). 5).   6). 6).          Prognosis: [x]Good   []Fair   []Poor  Patient Requires Follow-up:  [x]Yes  []No  Plan: []Plan of care initiated     [x]Continue per plan of care    [] Alter current plan (see comments)    []Hold pending MD visit []Discharge    Timed Code Treatment Minutes:  25  Total Treatment Minutes:  30    Medicare Cap total YTD:   Workers Comp Time Stamp  [x]N/A       Electronically signed by:  Ziggy Barry, PT, DPT, ATC [id number]

## 2020-03-16 NOTE — FLOWSHEET NOTE
Pt feels comfortable with [facility] HEP sheets and program. Pt understands he can call with questions if needed.

## 2020-03-16 NOTE — DISCHARGE SUMMARY
Outpatient Physical Therapy     [] Daily Treatment Note   [] Progress Note   [x] Discharge Note    Date:  3/16/2020    Patient Name:  Carlita Lozano    YOB: 1950    Medical Diagnosis: Dislocation L AC jt  ICD 10:  S43.122A  Treatment Diagnosis:   Dislocation L AC jt with L shldr pn, decr ROM, decr strength     Onset Date: Jan 18, 2020  Referral Date:  2/19/2020  Referring Physician: Wilma Chavez MD     Visits Allowed/Insurance/Certification Information: [employer] & Medicare      Restrictions/Precautions:    Contraindications to Manipulations: none  Red Flag Symptoms: Nausea/vomiting (metformin)  heart palpitations (started with Graves disease, PCP knows)  Adhesive allergy: yes, leaves sores  Latex allergy: no     Pt's Occupation/Job Duties:  Works as , full time, desk work,    Plan of care sent to provider:   []NA   [x]Faxed   []Co-signature     Plan of care signed:    []NA   []Yes   []No      Progress Note covers period from (if applicable):    [x]NA    []From          To         Next Progress Note due:   3/20/2020    Visit# / total visits:  5/16    Subjective:  N/a    Objective:  n/a      Exercises in bold performed in department today. Items not bolded are carried forward from prior visits for continuity of the record.   Exercise/Equipment Resistance/Repetitions HEP Other comments       []      Standing isometrics, 4 way + IR, ER 7-10 sec x5 [x]    Standing rows    Red x10 [x]    Standing shldr 4 way Red x10 [x]      SL shldr ER  1# x10 []        []      PROM/AAROM pulleys UE (flx, abd) dc [] Without pn or pulling      AROM (flx, abd) dc []        []          []         []        []        []        []      Supine breathing x10 min  []      Supine TA/PF x5 min  []        []        []        Functional Outcome Measure:     Measure Used:           SPADI  Date Assessed:           2/21/2020  Score:                          55/130 (SPADI)  % disability:                 42 %

## 2020-03-18 ENCOUNTER — HOSPITAL ENCOUNTER (OUTPATIENT)
Dept: PHYSICAL THERAPY | Age: 70
Setting detail: THERAPIES SERIES
End: 2020-03-18
Payer: COMMERCIAL

## 2020-03-25 ENCOUNTER — APPOINTMENT (OUTPATIENT)
Dept: PHYSICAL THERAPY | Age: 70
End: 2020-03-25
Payer: COMMERCIAL

## 2020-03-27 ENCOUNTER — APPOINTMENT (OUTPATIENT)
Dept: PHYSICAL THERAPY | Age: 70
End: 2020-03-27
Payer: COMMERCIAL

## 2021-04-22 ENCOUNTER — IMMUNIZATION (OUTPATIENT)
Dept: PRIMARY CARE CLINIC | Age: 71
End: 2021-04-22
Payer: OTHER GOVERNMENT

## 2021-04-22 PROCEDURE — 91300 COVID-19, PFIZER VACCINE 30MCG/0.3ML DOSE: CPT | Performed by: FAMILY MEDICINE

## 2021-04-22 PROCEDURE — 0001A COVID-19, PFIZER VACCINE 30MCG/0.3ML DOSE: CPT | Performed by: FAMILY MEDICINE

## 2021-05-13 ENCOUNTER — IMMUNIZATION (OUTPATIENT)
Dept: PRIMARY CARE CLINIC | Age: 71
End: 2021-05-13
Payer: OTHER GOVERNMENT

## 2021-05-13 PROCEDURE — 91300 COVID-19, PFIZER VACCINE 30MCG/0.3ML DOSE: CPT | Performed by: FAMILY MEDICINE

## 2021-05-13 PROCEDURE — 0002A COVID-19, PFIZER VACCINE 30MCG/0.3ML DOSE: CPT | Performed by: FAMILY MEDICINE
